# Patient Record
Sex: MALE | Race: WHITE
[De-identification: names, ages, dates, MRNs, and addresses within clinical notes are randomized per-mention and may not be internally consistent; named-entity substitution may affect disease eponyms.]

---

## 2021-10-12 ENCOUNTER — HOSPITAL ENCOUNTER (EMERGENCY)
Dept: HOSPITAL 53 - M ED | Age: 36
Discharge: HOME | End: 2021-10-12
Payer: COMMERCIAL

## 2021-10-12 VITALS — DIASTOLIC BLOOD PRESSURE: 87 MMHG | SYSTOLIC BLOOD PRESSURE: 136 MMHG

## 2021-10-12 VITALS — HEIGHT: 66 IN | BODY MASS INDEX: 24.98 KG/M2 | WEIGHT: 155.43 LBS
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
## 2021-10-12 DIAGNOSIS — Z79.899: ICD-10-CM

## 2021-10-12 DIAGNOSIS — S60.456A: Primary | ICD-10-CM

## 2021-10-12 NOTE — REP
INDICATION:

right 5th finger with fishhook FB



COMPARISON:

None.



TECHNIQUE:

AP, lateral, bilateral oblique views right 5th digit.



FINDINGS:

Portion of fish hook identified in the soft tissue adjacent to the head of the

proximal phalanx.  Osseous structures and joint spaces are intact and unaffected.



IMPRESSION:

Foreign body in the soft tissue.  No osseous or articular involvement.





<Electronically signed by Nelson Neal > 10/12/21 [id number]

## 2023-06-08 ENCOUNTER — HOSPITAL ENCOUNTER (EMERGENCY)
Dept: HOSPITAL 53 - M ED | Age: 38
Discharge: HOME | End: 2023-06-08
Payer: COMMERCIAL

## 2023-06-08 VITALS — SYSTOLIC BLOOD PRESSURE: 114 MMHG | OXYGEN SATURATION: 100 % | TEMPERATURE: 98.7 F | DIASTOLIC BLOOD PRESSURE: 83 MMHG

## 2023-06-08 VITALS — HEIGHT: 66 IN | BODY MASS INDEX: 24.62 KG/M2 | WEIGHT: 153.22 LBS

## 2023-06-08 DIAGNOSIS — S61.215A: Primary | ICD-10-CM
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
## 2023-06-08 DIAGNOSIS — F17.200: ICD-10-CM

## 2023-06-08 DIAGNOSIS — F12.10: ICD-10-CM
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
## 2025-05-17 ENCOUNTER — HOSPITAL ENCOUNTER (EMERGENCY)
Dept: HOSPITAL 53 - M ED | Age: 40
Discharge: LEFT BEFORE DISCHARGE | End: 2025-05-17
Payer: COMMERCIAL

## 2025-05-17 VITALS — OXYGEN SATURATION: 97 % | DIASTOLIC BLOOD PRESSURE: 68 MMHG | SYSTOLIC BLOOD PRESSURE: 124 MMHG

## 2025-05-17 VITALS — TEMPERATURE: 97.6 F

## 2025-05-17 DIAGNOSIS — F10.10: ICD-10-CM

## 2025-05-17 DIAGNOSIS — F12.10: ICD-10-CM

## 2025-05-17 DIAGNOSIS — F17.200: ICD-10-CM

## 2025-05-17 DIAGNOSIS — Z53.9: ICD-10-CM

## 2025-05-17 DIAGNOSIS — J93.9: Primary | ICD-10-CM

## 2025-05-17 LAB
APTT BLD: 28.1 SECONDS (ref 24.8–34.2)
BASOPHILS # BLD AUTO: 0.1 10^3/UL (ref 0–0.2)
BASOPHILS NFR BLD AUTO: 0.5 % (ref 0–1)
BUN SERPL-MCNC: 16 MG/DL (ref 9–23)
CALCIUM SERPL-MCNC: 8.9 MG/DL (ref 8.5–10.1)
CHLORIDE SERPL-SCNC: 108 MMOL/L (ref 98–107)
CK MB CFR.DF SERPL CALC: 0.41
CK MB SERPL-MCNC: 3.1 NG/ML (ref ?–3.6)
CK SERPL-CCNC: 741 U/L (ref 46–171)
CO2 SERPL-SCNC: 26 MMOL/L (ref 20–31)
CREAT SERPL-MCNC: 0.82 MG/DL (ref 0.7–1.3)
EOSINOPHIL # BLD AUTO: 0.1 10^3/UL (ref 0–0.5)
EOSINOPHIL NFR BLD AUTO: 0.4 % (ref 0–3)
GFR SERPL CREATININE-BSD FRML MDRD: > 90 ML/MIN/{1.73_M2} (ref 60–?)
GLUCOSE SERPL-MCNC: 108 MG/DL (ref 60–100)
HCT VFR BLD AUTO: 45.9 % (ref 42–52)
INR PPP: 1.1
LYMPHOCYTES # BLD AUTO: 3.6 10^3/UL (ref 1.5–5)
LYMPHOCYTES NFR BLD AUTO: 18.5 % (ref 24–44)
MCH RBC QN AUTO: 30.2 PG (ref 27–33)
MCHC RBC AUTO-ENTMCNC: 32.7 G/DL (ref 32–36.5)
MCV RBC AUTO: 92.5 FL (ref 80–96)
MONOCYTES # BLD AUTO: 1.1 10^3/UL (ref 0–0.8)
MONOCYTES NFR BLD AUTO: 5.4 % (ref 2–8)
NEUTROPHILS # BLD AUTO: 14.7 10^3/UL (ref 1.5–8.5)
NEUTROPHILS NFR BLD AUTO: 74.8 % (ref 36–66)
PLATELET # BLD AUTO: 299 10^3/UL (ref 150–450)
POTASSIUM SERPL-SCNC: 4.4 MMOL/L (ref 3.5–5.1)
PROTHROMBIN TIME: 14.5 SECONDS (ref 12.5–14.5)
RBC # BLD AUTO: 4.96 10^6/UL (ref 4.3–6.1)
SODIUM SERPL-SCNC: 141 MMOL/L (ref 136–145)
WBC # BLD AUTO: 19.7 10^3/UL (ref 4–10)

## 2025-05-17 PROCEDURE — 85025 COMPLETE CBC W/AUTO DIFF WBC: CPT

## 2025-05-17 PROCEDURE — 85730 THROMBOPLASTIN TIME PARTIAL: CPT

## 2025-05-17 PROCEDURE — 71045 X-RAY EXAM CHEST 1 VIEW: CPT

## 2025-05-17 PROCEDURE — 80048 BASIC METABOLIC PNL TOTAL CA: CPT

## 2025-05-17 PROCEDURE — 94760 N-INVAS EAR/PLS OXIMETRY 1: CPT

## 2025-05-17 PROCEDURE — 93005 ELECTROCARDIOGRAM TRACING: CPT

## 2025-05-17 PROCEDURE — 82553 CREATINE MB FRACTION: CPT

## 2025-05-17 PROCEDURE — 85610 PROTHROMBIN TIME: CPT

## 2025-05-17 PROCEDURE — 84484 ASSAY OF TROPONIN QUANT: CPT

## 2025-05-17 PROCEDURE — 99285 EMERGENCY DEPT VISIT HI MDM: CPT

## 2025-05-17 PROCEDURE — 93041 RHYTHM ECG TRACING: CPT

## 2025-05-17 PROCEDURE — 71260 CT THORAX DX C+: CPT

## 2025-05-17 PROCEDURE — 82550 ASSAY OF CK (CPK): CPT
